# Patient Record
Sex: FEMALE | Race: WHITE
[De-identification: names, ages, dates, MRNs, and addresses within clinical notes are randomized per-mention and may not be internally consistent; named-entity substitution may affect disease eponyms.]

---

## 2020-08-29 ENCOUNTER — HOSPITAL ENCOUNTER (EMERGENCY)
Dept: HOSPITAL 63 - ER | Age: 35
Discharge: HOME | End: 2020-08-29
Payer: OTHER GOVERNMENT

## 2020-08-29 VITALS — DIASTOLIC BLOOD PRESSURE: 85 MMHG | SYSTOLIC BLOOD PRESSURE: 131 MMHG

## 2020-08-29 VITALS — BODY MASS INDEX: 33.43 KG/M2 | HEIGHT: 65 IN | WEIGHT: 200.62 LBS

## 2020-08-29 DIAGNOSIS — Z91.013: ICD-10-CM

## 2020-08-29 DIAGNOSIS — Z91.010: ICD-10-CM

## 2020-08-29 DIAGNOSIS — Z88.0: ICD-10-CM

## 2020-08-29 DIAGNOSIS — N93.9: Primary | ICD-10-CM

## 2020-08-29 DIAGNOSIS — Z91.018: ICD-10-CM

## 2020-08-29 PROCEDURE — 81025 URINE PREGNANCY TEST: CPT

## 2020-08-29 PROCEDURE — 99282 EMERGENCY DEPT VISIT SF MDM: CPT

## 2020-08-29 NOTE — PHYS DOC
Past History


Past Medical History:  No Pertinent History


Past Surgical History:  No Surgical History


Alcohol Use:  None





Adult General


Chief Complaint


Chief Complaint:  VAGINAL BLEEDING





HPI


HPI





Patient is a 35-year-old female who presents for vaginal bleeding.  Onset was 1 

week ago.  Nothing known makes better or worse.  She is asymptomatic.  Patient 

reports having unremarkable menstrual cycle, most previous started on August 11 

and ended August 15.  It was normal in consistency and length.  Patient has been

asymptomatic ever since but reports since 1 week time without any known inciting

event, trauma, sexual activity or other foreign body, she has had vaginal 

spotting that is been intermittent with slight tinged red blood on tissue paper.

 She has seen her PCP about this this past week, she has work-up consisting of 

laboratory analysis pending.  She could not wait for results of serum pregnancy 

test and concerned because continued waxing and waning of her vaginal spotting 

prompting her to visit our ER for evaluation.  Of note she has been afebrile, 

denies any significant OB/GYN history, is not on any birth control and has no 

history of STDs





Review of Systems


Review of Systems


Fourteen body systems of review of systems have been reviewed. See HPI for 

pertinent positives and negative responses, other wise all other systems are ne

gative, non-pertinent or non-contributory





Allergies


Allergies





Allergies








Coded Allergies Type Severity Reaction Last Updated Verified


 


  Penicillins Allergy Unknown  8/29/20 Yes


 


  peanut Allergy Unknown  8/29/20 Yes


 


  shellfish derived Allergy Unknown  8/29/20 Yes


 


  wheat Allergy Unknown  8/29/20 Yes











Physical Exam


Physical Exam


Constitutional: Well developed, well nourished, no acute distress, non-toxic 

appearance. 


HENT: Normocephalic, atraumatic, bilateral external ears normal, oropharynx 

moist, no oral exudates, nose normal. 


Eyes: PERRLA, EOMI, conjunctiva normal, no discharge.  


Neck: Normal range of motion, no tenderness, supple, no stridor.  


Cardiovascular: Heart rate regular, sinus rhythm, no murmurs rubs or gallops


Lungs & Thorax:  Bilateral breath sounds clear to auscultation 


Abdomen: Bowel sounds normal, soft, no tenderness, no masses, no pulsatile 

masses.  Nonsurgical abdomen, no peritoneal signs


Skin: Warm, dry, no erythema, no rash.  


Back: No tenderness, no CVA tenderness.  


Extremities: No tenderness, no cyanosis, no clubbing, ROM intact, no edema.  


Neurologic: Alert and oriented X 3, grossly normal motor & sensory function, no 

focal deficits noted. 


Psychologic: Affect normal, judgement normal, mood normal.





Current Patient Data


Vital Signs





                                   Vital Signs








  Date Time  Temp Pulse Resp B/P (MAP) Pulse Ox O2 Delivery O2 Flow Rate FiO2


 


8/29/20 12:28 97.8 90 18 131/85 (100) 97 Room Air  











EKG


EKG


[]





Radiology/Procedures


Radiology/Procedures


[]





Course & Med Decision Making


Course & Med Decision Making


Well-appearing patient seen on immediate ER arrival


ABCs unremarkable


Comprehensive history and physical exam obtained


Discussed likely benign and self-limiting nature of patient's vaginal spotting, 

discussed little role in further diagnostic studies in ER setting


Patient has follow-up with PCP in 4 days time, I advised her to keep this.  

Patient to follow-up on recently obtained laboratory analysis with consideration

 for future diagnostic imaging work-up as indicated


Discussed this may be an acute presentation of more serious pathology although 

unlikely


Strict return precautions discussed with good understanding by patient, all 

questions and concerns addressed prior to ER departure in stable condition





Dragon Disclaimer


Dragon Disclaimer


This electronic medical record was generated, in whole or in part, using a voice

 recognition dictation system.





Departure


Departure:


Impression:  


   Primary Impression:  


   Vaginal spotting


Disposition:  01 HOME/RESIDENCE PRIOR TO ADM


Condition:  STABLE


Referrals:  


ADELAIDA RANGEL (PCP)





Justification of Admission:


Justification of Admission:


Justification of Admission Dx:  N/A











EL GALLO DO                 Aug 29, 2020 13:02